# Patient Record
Sex: MALE | Race: OTHER | HISPANIC OR LATINO | Employment: FULL TIME | URBAN - METROPOLITAN AREA
[De-identification: names, ages, dates, MRNs, and addresses within clinical notes are randomized per-mention and may not be internally consistent; named-entity substitution may affect disease eponyms.]

---

## 2024-08-03 ENCOUNTER — OFFICE VISIT (OUTPATIENT)
Dept: URGENT CARE | Facility: CLINIC | Age: 29
End: 2024-08-03

## 2024-08-03 VITALS
OXYGEN SATURATION: 97 % | DIASTOLIC BLOOD PRESSURE: 83 MMHG | TEMPERATURE: 98 F | HEART RATE: 91 BPM | BODY MASS INDEX: 30.31 KG/M2 | HEIGHT: 68 IN | SYSTOLIC BLOOD PRESSURE: 137 MMHG | WEIGHT: 200 LBS | RESPIRATION RATE: 18 BRPM

## 2024-08-03 DIAGNOSIS — L24.5 IRRITANT CONTACT DERMATITIS DUE TO OTHER CHEMICAL PRODUCTS: Primary | ICD-10-CM

## 2024-08-03 PROCEDURE — 99214 OFFICE O/P EST MOD 30 MIN: CPT | Mod: S$GLB,,, | Performed by: PHYSICIAN ASSISTANT

## 2024-08-03 RX ORDER — PREDNISONE 20 MG/1
TABLET ORAL
Qty: 7 TABLET | Refills: 0 | Status: SHIPPED | OUTPATIENT
Start: 2024-08-03 | End: 2024-08-07

## 2024-08-03 RX ORDER — BETAMETHASONE DIPROPIONATE 0.5 MG/G
CREAM TOPICAL 2 TIMES DAILY
Qty: 45 G | Refills: 0 | Status: SHIPPED | OUTPATIENT
Start: 2024-08-03 | End: 2024-08-10

## 2024-08-03 NOTE — PROGRESS NOTES
"Subjective:      Patient ID: Nathan Palmer is a 28 y.o. male.    Vitals:  height is 5' 8" (1.727 m) and weight is 90.7 kg (200 lb). His temperature is 98.4 °F (36.9 °C). His blood pressure is 137/83 and his pulse is 91. His respiration is 18 and oxygen saturation is 97%.     Chief Complaint: Rash    28-year-old male presents to urgent care clinic for evaluation.  He reports history of intermittent eczema between his hands that resolved with over-the-counter hydrocortisone 1%.  He has started using a new soap ([employer]) last week on Friday.  On Saturday,   Patient noticed a small patch of rash started on his left wrist that progressively worsened.  Now it has spread to both arms, legs, and to his face yesterday.  Rash is itchy.  Started OTC Claritin yesterday because 1 of his friend is a medical practitioner.  No other associated symptoms.  Requesting work note to be off for 2 days and return on Monday.  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  Wears long sleeves for sun protection when he does go out in the sun.      Rash  This is a new problem. The current episode started in the past 7 days. The affected locations include the face, left hand, right hand, right arm, left arm and left upper leg. He was exposed to nothing. Pertinent negatives include no congestion, cough, diarrhea, eye pain, fatigue, fever, shortness of breath, sore throat or vomiting. Past treatments include antihistamine and topical steroids. The treatment provided mild relief. His past medical history is significant for eczema.       Constitution: Negative for activity change, chills, sweating, fatigue, fever and generalized weakness.   HENT:  Negative for ear pain, hearing loss, facial swelling, congestion, postnasal drip, sinus pain, sinus pressure, sore throat, trouble swallowing and voice change.    Neck: Negative for neck pain, neck stiffness and painful lymph nodes. "   Cardiovascular:  Negative for chest pain, leg swelling, palpitations, sob on exertion and passing out.   Eyes:  Negative for eye discharge, eye pain, eye redness, photophobia, vision loss, double vision, blurred vision and eyelid swelling.   Respiratory:  Negative for chest tightness, cough, sputum production, COPD, shortness of breath, wheezing and asthma.    Gastrointestinal:  Negative for abdominal pain, nausea, vomiting, diarrhea, bright red blood in stool, dark colored stools, rectal bleeding, heartburn and bowel incontinence.   Genitourinary:  Negative for dysuria, frequency, urgency, urine decreased, flank pain, bladder incontinence, hematuria and history of kidney stones.   Musculoskeletal:  Negative for trauma, joint pain, joint swelling, abnormal ROM of joint, muscle cramps and muscle ache.   Skin:  Positive for rash and erythema. Negative for color change, pale and wound.   Allergic/Immunologic: Positive for environmental allergies and itching. Negative for seasonal allergies, asthma and immunocompromised state.   Neurological:  Negative for dizziness, history of vertigo, light-headedness, passing out, facial drooping, speech difficulty, coordination disturbances, loss of balance, headaches, disorientation, altered mental status, loss of consciousness, numbness, tingling and seizures.   Hematologic/Lymphatic: Negative for swollen lymph nodes, easy bruising/bleeding and trouble clotting. Does not bruise/bleed easily.   Psychiatric/Behavioral:  Negative for altered mental status and disorientation.       Objective:     Physical Exam   Constitutional: He appears well-developed. He is cooperative. He does not appear ill. No distress.      Comments:Well appearing     HENT:   Head: Normocephalic.   Ears:   Right Ear: Hearing, external ear and ear canal normal.   Left Ear: Hearing, external ear and ear canal normal.   Nose: Nose normal.   Mouth/Throat: Oropharynx is clear and moist. Mucous membranes are moist.    Eyes: Conjunctivae and EOM are normal. Right eye exhibits no discharge. Left eye exhibits no discharge. vision grossly intact gaze aligned appropriately   Neck: Phonation normal. Neck supple.   Cardiovascular: Normal rate and regular rhythm.   Pulmonary/Chest: Effort normal. No accessory muscle usage. No respiratory distress. He has no wheezes.   Abdominal: Normal appearance. He exhibits no distension.   Musculoskeletal:      Right lower leg: No edema.      Left lower leg: No edema.      Comments: Moves all extremities with normal tone, strength, and ROM.    Gait normal.   Neurological: no focal deficit. He is alert and at baseline. He has normal motor skills. He displays facial symmetry and no dysarthria. No cranial nerve deficit. He exhibits normal muscle tone. Gait and coordination normal.   Skin: Skin is warm, dry, intact and rash. Capillary refill takes less than 2 seconds. erythema   Psychiatric: His speech is normal and behavior is normal. Mood, affect, judgment and thought content normal.   Nursing note and vitals reviewed.            Assessment:     1. Irritant contact dermatitis due to other chemical products        Plan:     Emphasized importance of prescribed and OTC symptomatic treatment to prevent worsening of condition.  Avoid irritant body wash.  Add Zyrtec q.a.m. and Benadryl q.p.m. as needed for itching.  Continue OTC hydrocortisone 1% cream to face given low potency.  Use betamethasone high potency cream for body rash.  Short course of oral steroids.  Use p.r.n. moisturizing lotion and sensitive skin body wash to help with symptoms.      Irritant contact dermatitis due to other chemical products  -     predniSONE (DELTASONE) 20 MG tablet; Take 2 tablets (40 mg total) by mouth once daily for 2 days, THEN 1 tablet (20 mg total) once daily for 3 days. Take with food in AM.  Dispense: 7 tablet; Refill: 0  -     betamethasone dipropionate 0.05 % cream; Apply topically 2 (two) times daily. Apply to  body rash for 7 days. Avoid face. for 7 days  Dispense: 45 g; Refill: 0    Note dictated with voice recognition software, please excuse any grammatical errors.    We had shared decision making for patient's treatment. We discussed side effects/alternatives/benefits/risk and patient would like to proceed with treatment plan. We also discussed other OTC treatment recommendations.    Patient was counseled expected course and answered all of questions.     Patient was instructed to return for re-evaluation with urgent care or PCP for continued outpatient workup and management if symptoms do not improve/worsening symptoms. Strict ED versus clinic precautions given in depth.   Guideline, discharge and follow-up instructions given verbally/printed; patient will also receive via [employer]. Patient verbalized understanding and agreed with the entirety of plan of care.      Patient Instructions   PLEASE READ YOUR DISCHARGE INSTRUCTIONS ENTIRELY AS IT CONTAINS IMPORTANT INFORMATION.    FOR YOUR ITCHING:  -You should use Benadryl every 12 hours AS NEEDED FOR SEVERE ITCHING.  Be careful with this medication, as it may cause drowsiness.    -Please add an over the counter antihistamine medication (Claritin/Zyrtec) of your choice as directed daily to help with your allergic reaction. May taper meds to off when symptoms improve.    -Be careful with these medication, as it may cause drowsiness or constipation.       Do not use prescribed betamethasone steroid cream on your face.  Only use prescribed steroid cream as for your body.  Only use OTC hydrocortisone 1% cream twice a day for 7-10 days on your face; avoid excessive sun exposure.  -We discussed steroid side effects which are not limited to high blood pressure/glucose/heart rate, nervousness, psychosis, facial flushing, insomnia, bone loss, immunosuppression, stomach ulcer, hormone imbalance, and fat dystrophy at injection site, etc.     Basic skin care:  Avoid very hot showers.   Use cold compressions.  Apply moisturizing lotion such as Eucerin or Aveeno eczema.  Use sun protection.    Please return here within 14 days or see your primary care doctor or Dermatology if you develop new or worsening symptoms.     -You must understand that you've received an Urgent Care treatment only and that you may be released before all your medical problems are known or treated. You, the patient, will arrange for follow up care as instructed. Please arrange follow up with your primary medical clinic within 2-5 days if your signs and symptoms have not resolved or worsen.   - Follow up with your PCP or specialty clinic as directed.  You can call [phone] or [phone] to schedule an appointment with the appropriate provider.    - If your condition worsens or fails to improve we recommend that you receive another evaluation at the emergency room immediately or contact your primary medical clinic to discuss your concerns in next 2-5 days.  Strict ER versus clinic precautions given.      RED FLAGS/WARNING SYMPTOMS DISCUSSED WITH PATIENT THAT WOULD WARRANT EMERGENT MEDICAL ATTENTION. Patient aware and verbalized understanding.           Additional MDM:     Heart Failure Score:   COPD = No

## 2024-08-03 NOTE — PATIENT INSTRUCTIONS
PLEASE READ YOUR DISCHARGE INSTRUCTIONS ENTIRELY AS IT CONTAINS IMPORTANT INFORMATION.    FOR YOUR ITCHING:  -You should use Benadryl every 12 hours AS NEEDED FOR SEVERE ITCHING.  Be careful with this medication, as it may cause drowsiness.    -Please add an over the counter antihistamine medication (Claritin/Zyrtec) of your choice as directed daily to help with your allergic reaction. May taper meds to off when symptoms improve.    -Be careful with these medication, as it may cause drowsiness or constipation.       Do not use prescribed betamethasone steroid cream on your face.  Only use prescribed steroid cream as for your body.  Only use OTC hydrocortisone 1% cream twice a day for 7-10 days on your face; avoid excessive sun exposure.  -We discussed steroid side effects which are not limited to high blood pressure/glucose/heart rate, nervousness, psychosis, facial flushing, insomnia, bone loss, immunosuppression, stomach ulcer, hormone imbalance, and fat dystrophy at injection site, etc.     Basic skin care:  Avoid very hot showers.  Use cold compressions.  Apply moisturizing lotion such as Eucerin or Aveeno eczema.  Use sun protection.    Please return here within 14 days or see your primary care doctor or Dermatology if you develop new or worsening symptoms.     -You must understand that you've received an Urgent Care treatment only and that you may be released before all your medical problems are known or treated. You, the patient, will arrange for follow up care as instructed. Please arrange follow up with your primary medical clinic within 2-5 days if your signs and symptoms have not resolved or worsen.   - Follow up with your PCP or specialty clinic as directed.  You can call [phone] or [phone] to schedule an appointment with the appropriate provider.    - If your condition worsens or fails to improve we recommend that you receive another evaluation at the emergency room immediately or contact  your primary medical clinic to discuss your concerns in next 2-5 days.  Strict ER versus clinic precautions given.      RED FLAGS/WARNING SYMPTOMS DISCUSSED WITH PATIENT THAT WOULD WARRANT EMERGENT MEDICAL ATTENTION. Patient aware and verbalized understanding.

## 2024-08-03 NOTE — LETTER
August 3, 2024    Nathan Palmer  [address] [address]         [facility] [place]  [facility] [place] [facility] [address]  Phone: [phone]  Fax: [phone] August 3, 2024     Patient: Nathan Palmer   YOB: 1995   Date of Visit: 8/3/2024       To Whom It May Concern:    It is my medical opinion that Nathan Palmer may return to work on 8/5/24 .    If you have any questions or concerns, please don't hesitate to call.    Sincerely,        Geovanni Sandoval PA-C